# Patient Record
Sex: MALE | Race: WHITE | URBAN - METROPOLITAN AREA
[De-identification: names, ages, dates, MRNs, and addresses within clinical notes are randomized per-mention and may not be internally consistent; named-entity substitution may affect disease eponyms.]

---

## 2018-06-15 ENCOUNTER — TELEPHONE (OUTPATIENT)
Dept: FAMILY MEDICINE | Facility: CLINIC | Age: 33
End: 2018-06-15

## 2018-06-15 NOTE — TELEPHONE ENCOUNTER
Called and left voice message to call Pauline on direct line to discuss upcoming office visit scheduled with Pauline Voss.    If patient returns call please notify Pauline or put through to her voice mail.    Isabel Person RN

## 2018-06-18 NOTE — TELEPHONE ENCOUNTER
Called patient and left voice message to call clinic and speak with Clinic Manager regarding today's cancelled appt and subsequent appt scheduled for 6/28.    Isabel Person RN

## 2018-06-27 NOTE — PROGRESS NOTES
SUBJECTIVE:   CC: Thompson Mohan is an 33 year old male new patient .. He presents for preventative health visit. His last CPE was 10 years ago. He has not been getting regular health exams and has not had a PCP for a long time.  He has the following concerns he would like addressed at his visit today:      1. [facility] 2.  2. Low back and shoulder pain:  Present pain with lifting 2/2018 low back bilaterally.  NO radiation, muscle weakness numbness or tingling.  Acute pain resolved but still has issues intermittently.  May be his mattress.  3. Seasonal allergies:  Runny nose scratchy throat.       Healthy Habits:    Do you get at least three servings of calcium containing foods daily (dairy, green leafy vegetables, etc.)? yes    Amount of exercise or daily activities, outside of work: Does not really work out but is quite active with his work.  He is a freelance     Problems taking medications regularly not applicable    Medication side effects: No    Have you had an eye exam in the past two years? Yes--wears contacts    Do you see a dentist twice per year? No--has an appointment set up    Do you have sleep apnea, excessive snoring or daytime drowsiness?no      Today's PHQ-2 Score:   PHQ-2 ( 1999 [employer]) 6/28/2018   Q1: Little interest or pleasure in doing things 0   Q2: Feeling down, depressed or hopeless 0   PHQ-2 Score 0     There are no active problems to display for this patient.      History reviewed. No pertinent past medical history.    Past Surgical History:   Procedure Laterality Date     ENT SURGERY      Age 13  trampoline injury       Family History   Problem Relation Age of Onset     Type 2 Diabetes Mother      Skin Cancer Maternal Grandfather      No melanoma       Social History   Substance Use Topics     Smoking status: Current Every Day Smoker     Packs/day: 0.50     Years: 18.00     Types: Cigarettes     Smokeless tobacco: Never Used      Comment: Quit for 4  months--has his own plan for quitting     Alcohol use 15.0 oz/week     25 Standard drinks or equivalent per week      Comment: 20-25 drinks a week   CAGE screen negative. He can and does stop drinking.    Social History     Social History Narrative    Lives alone.  No pets.    Originally from [place]. Has been in [place] for 9 years.  Came her for the work.        Has a good support system--kind of a loner.    Feels safe in all environments.--some anxiety in large groups.    Wears seatbelt 100% of the time:: NO--Recommended.    Wears helmet while biking.    Denies history of abuse, past or present, physical, sexual or emotional.    06/28/18    Pauline Voss PA-C                   No current outpatient prescriptions on file.       Reviewed orders with patient. Reviewed health maintenance and updated orders accordingly - Yes    Reviewed and updated as needed this visit by clinical staff  Tobacco  Allergies  Meds  Problems  Med Hx  Surg Hx  Fam Hx  Soc Hx          Reviewed and updated as needed this visit by Provider  Tobacco  Allergies  Meds  Problems  Med Hx  Surg Hx  Fam Hx  Soc Hx         ROS:  CONSTITUTIONAL: NEGATIVE for fever, chills, change in weight  INTEGUMENTARY/SKIN: NEGATIVE for worrisome rashes, moles or lesions  EYES: NEGATIVE for vision changes or irritation  ENT: NEGATIVE for ear, mouth and throat problems  RESP: NEGATIVE for significant cough or SOB  CV: NEGATIVE for chest pain, palpitations or peripheral edema  GI: NEGATIVE for nausea, abdominal pain, heartburn, or change in bowel habits   male: negative for dysuria, hematuria, decreased urinary stream, erectile dysfunction, urethral discharge  MUSCULOSKELETAL: NEGATIVE for significant arthralgias or myalgia  NEURO: NEGATIVE for weakness, dizziness or paresthesias  ENDOCRINE: NEGATIVE for temperature intolerance, skin/hair changes  HEME/ALLERGY/IMMUNE: NEGATIVE for bleeding problems  PSYCHIATRIC: NEGATIVE for changes in mood or  "affect    OBJECTIVE:   /83  Pulse 81  Temp 98.1  F (36.7  C) (Oral)  Ht 6' 2.72\" (189.8 cm)  Wt 209 lb 8 oz (95 kg)  SpO2 98%  BMI 26.38 kg/m2  EXAM:  GENERAL: healthy, alert and no distress  EYES: Eyes grossly normal to inspection, PERRL and conjunctivae and sclerae normal  HENT: ear canals and TM's normal, nose and mouth without ulcers or lesions  NECK: no adenopathy, no asymmetry, masses, or scars and thyroid normal to palpation. No bruits  RESP: lungs clear to auscultation - no rales, rhonchi or wheezes  CV: regular rate and rhythm, normal S1 S2, no S3 or S4, no murmur, click or rub, no peripheral edema and peripheral pulses strong  ABDOMEN: soft, nontender, no hepatosplenomegaly, no masses and bowel sounds normal  MS: no gross musculoskeletal defects noted, no clubbing, edema or cyanosis of extremities.  Pulses = and appropriate bilaterally to DP and PT  SKIN: no suspicious lesions or rashes  NEURO: Normal strength and tone, mentation intact and speech normal  PSYCH: mentation appears normal, affect normal/bright  LYMPH: no cervical, supraclavicular, axillary, or inguinal adenopathy      ASSESSMENT/PLAN:       ICD-10-CM    1. Routine general medical examination at a health care facility Z00.00 TDAP VACCINE (BOOSTRIX)   2. Fatigue, unspecified type R53.83 CBC with platelets differential     Vitamin D deficiency screening     TSH with free T4 reflex   3. Screening for lipid disorders Z13.220 Lipid Panel ([facility])   4. Screening for human immunodeficiency virus Z11.4        COUNSELING:  Reviewed preventive health counseling, as reflected in patient instructions  Special attention given to:        Consider AAA screening for ages 65-75 and smoking history       Regular exercise       Healthy diet/nutrition       Vision screening       Immunizations    Vaccinated for: Pneumococcal and TDAP             Alcohol Use       Safe sex practices/STD prevention       Osteoporosis Prevention/Bone Health       One " "time pneumovax for smokers    BP Readings from Last 1 Encounters:   06/28/18 131/83     Estimated body mass index is 26.38 kg/(m^2) as calculated from the following:    Height as of this encounter: 6' 2.72\" (189.8 cm).    Weight as of this encounter: 209 lb 8 oz (95 kg).       reports that he has been smoking Cigarettes.  He has a 9.00 pack-year smoking history. He has never used smokeless tobacco.  Tobacco Cessation Action Plan: Information offered: Patient not interested at this time  Phone counseling: Place order for QuitPlan (Tobacco Cessation [facility] Referral 6680)    Counseling Resources:  ATP IV Guidelines  Pooled Cohorts Equation Calculator  FRAX Risk Assessment  ICSI Preventive Guidelines  Dietary Guidelines for Americans, 2010  USDA's MyPlate  ASA Prophylaxis  Lung CA Screening    Liliana Voss PA-C  [facility]  "

## 2018-06-27 NOTE — PATIENT INSTRUCTIONS
For allergies use OTC flonase once daily as a controller medication.  Add zyrtec or claritin when needed.        Welcome to [facility], where we are committed to the art of inspired primary care.  Thank you for choosing us to be a part of your well-being.    The clinic is open Monday through Friday, 8:00 a.m.   5:00 p.m., and Saturdays from 8:00 a.m.   12:00 p.m.  After-hours questions are directed to our 24-hour nurse line, which can be reached by calling the clinic at [phone].  You can also contact the clinic through [employer], our online patient portal.  (Please allow 1-2 business days for a response via [employer].)  If you are not already enrolled in [employer], access instructions are below.    If you need a refill on your prescription, please contact the pharmacy where you filled it, and they will contact our clinic with the details of what is needed.  If your prescription is a controlled substance, you will have a conversation with your provider to determine if you would like to  your prescription at the clinic or have it mailed to your pharmacy.  Please allow 2-3 business days for all refill requests to be handled.    We look forward to providing you with great care!  Please let me know if you have any questions.  Thank you for allowing me to participate in your care.  It was my pleasure meeting you.  Pauline Voss PA-C      Preventive Health Recommendations  Male Ages 26 - 39    Yearly exam:             See your health care provider every year in order to  o   Review health changes.   o   Discuss preventive care.    o   Review your medicines if your doctor has prescribed any.    You should be tested each year for STDs (sexually transmitted diseases), if you re at risk.     After age 35, talk to your provider about cholesterol testing. If you are at risk for heart disease, have your cholesterol tested at least every 5 years.     If you are at risk for diabetes, you  should have a diabetes test (fasting glucose).  Shots: Get a flu shot each year. Get a tetanus shot every 10 years.     Nutrition:    Eat at least 5 servings of fruits and vegetables daily.     Eat whole-grain bread, whole-wheat pasta and brown rice instead of white grains and rice.     Get adequate Calcium and Vitamin D.     Lifestyle    Exercise for at least 150 minutes a week (30 minutes a day, 5 days a week). This will help you control your weight and prevent disease.     Limit alcohol to one drink per day.     No smoking.     Wear sunscreen to prevent skin cancer.     See your dentist every six months for an exam and cleaning.   .When You Have Low Back Pain    Caring for Your Back  You are not alone.    Low back pain is very common. Nearly half of all adults have low back pain in any given year. The good news is that back pain is rarely a danger to your health. Most people can manage their back pain on their own and about half of them start feeling better within 2 weeks. In 9 out of 10 cases, low back pain goes away or no longer limits daily activity within 6 weeks.     Your outlook is good!    Your symptoms tell us that your low back pain is most likely not a danger to you. Most of the time we do not know the exact cause of low back pain, even if you see a doctor or have an MRI. However, treatment can still work without knowing the cause of the pain. Less than 1 in 100 people need surgery for their back pain.     What can I do about my low back pain?     There are three things you can do to ease low back pain and help it go away.    Use heat or cold packs.    Take medicine as directed.    Use positions, movements and exercises.     Using heat or cold packs    Try cold packs or gentle heat to ease your pain. Use whichever gives you the most relief. Apply the cold pack or heat for 15 minutes at a time, as often as needed.    Taking medicine      If your doctor has prescribed medicine, be sure to follow the  directions.    If you take over-the-counter medicine, read and follow the directions.    Talk to your doctor if you have any questions.     Using positions, movements and exercises    Research tells us that moving your joints and muscles can help you recover from back pain. Such activity should be simple and gentle. Use the positions in the photos as well as walking to help relieve your pain. Try taking a short walk every 3 to 4 hours during the day. Walk for a few minutes inside your home or take longer walks outside, on a treadmill or at a mall. Slowly increase the amount of time you walk. Expect discomfort when you begin, but it should lessen as your back starts to heal. When your back feels better, walk daily to keep your back and body healthy.    Finding a comfortable position    When your back pain is new, certain positions will ease your pain. Gently try each of the positions below until you find one that is helpful. Once you find a position of comfort, use it as often as you like when you are resting. You will recover faster if you combine rest with activity.         Lie on your back with your legs bent. You can do this by placing a pillow under your knees. Or you may lie on the floor and rest your lower legs on the seat of a chair.       Lie on your side with your knees bent, and place a pillow between your knees.       Lie on your stomach over pillows.      When should I call my doctor?    Your back pain should improve over the first couple of weeks. As it improves, you should be able to return to your normal activities. But call your doctor if:    You have a sudden change in your ability to control your bladder or bowels.    You feel tingling in your groin or legs.    The pain spreads down your leg and into your foot.    Your toes, feet or leg muscles feel weak.    You feel generally unwell or sick.    Your pain does not get better or gets worse.      If you are deaf or hard of hearing, please let us  know. We provide many free services including sign language interpreters,oral interpreters, TTYs, telephone amplifiers, note takers and written materials.    For informational purposes only. Not to replace the advice of your health care provider. Copyright   2013 [facility]. All rights reserved. [employer] [address]   Rev 06/14.

## 2018-06-28 ENCOUNTER — OFFICE VISIT (OUTPATIENT)
Dept: FAMILY MEDICINE | Facility: CLINIC | Age: 33
End: 2018-06-28
Payer: COMMERCIAL

## 2018-06-28 VITALS
HEIGHT: 75 IN | TEMPERATURE: 98.1 F | HEART RATE: 81 BPM | DIASTOLIC BLOOD PRESSURE: 83 MMHG | OXYGEN SATURATION: 98 % | SYSTOLIC BLOOD PRESSURE: 131 MMHG | BODY MASS INDEX: 26.05 KG/M2 | WEIGHT: 209.5 LBS

## 2018-06-28 DIAGNOSIS — Z13.220 SCREENING FOR LIPID DISORDERS: ICD-10-CM

## 2018-06-28 DIAGNOSIS — R53.83 FATIGUE, UNSPECIFIED TYPE: ICD-10-CM

## 2018-06-28 DIAGNOSIS — Z00.00 ROUTINE GENERAL MEDICAL EXAMINATION AT A HEALTH CARE FACILITY: Primary | ICD-10-CM

## 2018-06-28 DIAGNOSIS — Z11.4 SCREENING FOR HUMAN IMMUNODEFICIENCY VIRUS: ICD-10-CM

## 2018-06-28 LAB
BASOPHILS # BLD AUTO: 0 10E9/L (ref 0–0.2)
BASOPHILS NFR BLD AUTO: 0.3 %
CHOLEST SERPL-MCNC: 204 MG/DL (ref 0–200)
CHOLEST/HDLC SERPL: 4.1 {RATIO} (ref 0–5)
DIFFERENTIAL METHOD BLD: ABNORMAL
EOSINOPHIL # BLD AUTO: 0.2 10E9/L (ref 0–0.7)
EOSINOPHIL NFR BLD AUTO: 1.4 %
ERYTHROCYTE [DISTWIDTH] IN BLOOD BY AUTOMATED COUNT: 13 % (ref 10–15)
FASTING SPECIMEN: NO
HCT VFR BLD AUTO: 50.9 % (ref 40–53)
HDLC SERPL-MCNC: 50 MG/DL
HGB BLD-MCNC: 17.9 G/DL (ref 13.3–17.7)
IMM GRANULOCYTES # BLD: 0 10E9/L (ref 0–0.4)
IMM GRANULOCYTES NFR BLD: 0.3 %
LDLC SERPL CALC-MCNC: 109 MG/DL (ref 0–129)
LYMPHOCYTES # BLD AUTO: 2 10E9/L (ref 0.8–5.3)
LYMPHOCYTES NFR BLD AUTO: 18.2 %
MCH RBC QN AUTO: 33.2 PG (ref 26.5–33)
MCHC RBC AUTO-ENTMCNC: 35.2 G/DL (ref 31.5–36.5)
MCV RBC AUTO: 94 FL (ref 78–100)
MONOCYTES # BLD AUTO: 0.6 10E9/L (ref 0–1.3)
MONOCYTES NFR BLD AUTO: 5.6 %
NEUTROPHILS # BLD AUTO: 8.3 10E9/L (ref 1.6–8.3)
NEUTROPHILS NFR BLD AUTO: 74.2 %
NRBC # BLD AUTO: 0 10*3/UL
NRBC BLD AUTO-RTO: 0 /100
PLATELET # BLD AUTO: 156 10E9/L (ref 150–450)
RBC # BLD AUTO: 5.39 10E12/L (ref 4.4–5.9)
TRIGL SERPL-MCNC: 224 MG/DL (ref 0–150)
VLDL-CHOLESTEROL: 45 (ref 7–32)
WBC # BLD AUTO: 11.1 10E9/L (ref 4–11)

## 2018-06-28 NOTE — NURSING NOTE
Screening Questionnaire for Adult Immunization    Are you sick today?   No   Do you have allergies to medications, food, a vaccine component or latex?   No   Have you ever had a serious reaction after receiving a vaccination?   No   Do you have a long-term health problem with heart disease, lung disease, asthma, kidney disease, metabolic disease (e.g. diabetes), anemia, or other blood disorder?   No   Do you have cancer, leukemia, HIV/AIDS, or any other immune system problem?   No   In the past 3 months, have you taken medications that affect  your immune system, such as prednisone, other steroids, or anticancer drugs; drugs for the treatment of rheumatoid arthritis, Crohn s disease, or psoriasis; or have you had radiation treatments?   No   Have you had a seizure, or a brain or other nervous system problem?   No   During the past year, have you received a transfusion of blood or blood     products, or been given immune (gamma) globulin or antiviral drug?   No   For women: Are you pregnant or is there a chance you could become        pregnant during the next month?   No   Have you received any vaccinations in the past 4 weeks?   No     Immunization questionnaire answers were all negative.        Given by Dana Hernandez. Patient instructed to remain in clinic for 15 minutes afterwards, and to report any adverse reaction to me immediately.       Screening performed by Dana Hernandez on 6/28/2018 at 4:13 PM.

## 2018-06-28 NOTE — LETTER
July 1, 2018      Thompson Mohan  [address]   [facility] [address]              Dear Thompson,    Below are your recent lab results.  Your cholesterol and triglycerides are mildly elevated, likely because it was not a fasting specimen.  Your HDL and LDL are in a good range and this is reassuring.  I recommend a healthy diet and regular exercise.  We should recheck your cholesterol in 2 years.    Your white blood cell count and hemoglobin--a measure of iron--were slightly elevated. I suspect it may be because you were a bit dehydrated that day.  Also the elevated hemoglobin is not uncommon in smokers. I don't think we need to repeat at this point.    Your vitamin D level and thyroid test were normal.    Please let me know if you have any questions.  Thank you for allowing me to participate in your care.  It was my pleasure meeting you.    Sincerely,          Liliana Voss PA-C    Results for orders placed or performed in visit on 06/28/18   Lipid Panel ([facility])   Result Value Ref Range    FASTING SPECIMEN no     Cholesterol 204.0 (H) 0.0 - 200.0    HDL Cholesterol 50.0 >40.0    Triglycerides 224.0 (H) 0.0 - 150.0    Cholesterol/HDL Ratio 4.1 0.0 - 5.0    LDL Cholesterol Direct 109.0 0.0 - 129.0    VLDL-Cholesterol 45.0 (H) 7.0 - 32.0   CBC with platelets differential   Result Value Ref Range    WBC 11.1 (H) 4.0 - 11.0 10e9/L    RBC Count 5.39 4.4 - 5.9 10e12/L    Hemoglobin 17.9 (H) 13.3 - 17.7 g/dL    Hematocrit 50.9 40.0 - 53.0 %    MCV 94 78 - 100 fl    MCH 33.2 (H) 26.5 - 33.0 pg    MCHC 35.2 31.5 - 36.5 g/dL    RDW 13.0 10.0 - 15.0 %    Platelet Count 156 150 - 450 10e9/L    Diff Method Automated Method     % Neutrophils 74.2 %    % Lymphocytes 18.2 %    % Monocytes 5.6 %    % Eosinophils 1.4 %    % Basophils 0.3 %    % Immature Granulocytes 0.3 %    Nucleated RBCs 0 0 /100    Absolute Neutrophil 8.3 1.6 - 8.3 10e9/L    Absolute Lymphocytes 2.0 0.8 - 5.3 10e9/L    Absolute Monocytes 0.6 0.0 - 1.3  10e9/L    Absolute Eosinophils 0.2 0.0 - 0.7 10e9/L    Absolute Basophils 0.0 0.0 - 0.2 10e9/L    Abs Immature Granulocytes 0.0 0 - 0.4 10e9/L    Absolute Nucleated RBC 0.0    Vitamin D deficiency screening   Result Value Ref Range    Vitamin D Deficiency screening 42 20 - 75 ug/L   TSH with free T4 reflex   Result Value Ref Range    TSH 1.50 0.40 - 4.00 mU/L

## 2018-06-28 NOTE — MR AVS SNAPSHOT
After Visit Summary   6/28/2018    Thompson Mohan    MRN: [id number]           Patient Information     Date Of Birth          1985        Visit Information        Provider Department      6/28/2018 3:00 PM Liliana Voss PA-C [facility]        Today's Diagnoses     Routine general medical examination at a health care facility    -  1    Screening for lipid disorders        Screening for human immunodeficiency virus        Fatigue, unspecified type          Care Instructions    For allergies use OTC flonase once daily as a controller medication.  Add zyrtec or claritin when needed.        Welcome to [facility], where we are committed to the art of [facility] primary care.  Thank you for choosing us to be a part of your well-being.    The clinic is open Monday through Friday, 8:00 a.m. - 5:00 p.m., and Saturdays from 8:00 a.m. - 12:00 p.m.  After-hours questions are directed to our 24-hour nurse line, which can be reached by calling the clinic at [phone].  You can also contact the clinic through BlikBook, our online patient portal.  (Please allow 1-2 business days for a response via BlikBook.)  If you are not already enrolled in BlikBook, access instructions are below.    If you need a refill on your prescription, please contact the pharmacy where you filled it, and they will contact our clinic with the details of what is needed.  If your prescription is a controlled substance, you will have a conversation with your provider to determine if you would like to  your prescription at the clinic or have it mailed to your pharmacy.  Please allow 2-3 business days for all refill requests to be handled.    We look forward to providing you with great care!  Please let me know if you have any questions.  Thank you for allowing me to participate in your care.  It was my pleasure meeting you.  Pauline Voss PA-C      Preventive Health Recommendations  Male  Ages 26 - 39    Yearly exam:             See your health care provider every year in order to  o   Review health changes.   o   Discuss preventive care.    o   Review your medicines if your doctor has prescribed any.    You should be tested each year for STDs (sexually transmitted diseases), if you re at risk.     After age 35, talk to your provider about cholesterol testing. If you are at risk for heart disease, have your cholesterol tested at least every 5 years.     If you are at risk for diabetes, you should have a diabetes test (fasting glucose).  Shots: Get a flu shot each year. Get a tetanus shot every 10 years.     Nutrition:    Eat at least 5 servings of fruits and vegetables daily.     Eat whole-grain bread, whole-wheat pasta and brown rice instead of white grains and rice.     Get adequate Calcium and Vitamin D.     Lifestyle    Exercise for at least 150 minutes a week (30 minutes a day, 5 days a week). This will help you control your weight and prevent disease.     Limit alcohol to one drink per day.     No smoking.     Wear sunscreen to prevent skin cancer.     See your dentist every six months for an exam and cleaning.   .When You Have Low Back Pain    Caring for Your Back  You are not alone.    Low back pain is very common. Nearly half of all adults have low back pain in any given year. The good news is that back pain is rarely a danger to your health. Most people can manage their back pain on their own and about half of them start feeling better within 2 weeks. In 9 out of 10 cases, low back pain goes away or no longer limits daily activity within 6 weeks.     Your outlook is good!    Your symptoms tell us that your low back pain is most likely not a danger to you. Most of the time we do not know the exact cause of low back pain, even if you see a doctor or have an MRI. However, treatment can still work without knowing the cause of the pain. Less than 1 in 100 people need surgery for their back  pain.     What can I do about my low back pain?     There are three things you can do to ease low back pain and help it go away.    Use heat or cold packs.    Take medicine as directed.    Use positions, movements and exercises.     Using heat or cold packs    Try cold packs or gentle heat to ease your pain. Use whichever gives you the most relief. Apply the cold pack or heat for 15 minutes at a time, as often as needed.    Taking medicine      If your doctor has prescribed medicine, be sure to follow the directions.    If you take over-the-counter medicine, read and follow the directions.    Talk to your doctor if you have any questions.     Using positions, movements and exercises    Research tells us that moving your joints and muscles can help you recover from back pain. Such activity should be simple and gentle. Use the positions in the photos as well as walking to help relieve your pain. Try taking a short walk every 3 to 4 hours during the day. Walk for a few minutes inside your home or take longer walks outside, on a treadmill or at a mall. Slowly increase the amount of time you walk. Expect discomfort when you begin, but it should lessen as your back starts to heal. When your back feels better, walk daily to keep your back and body healthy.    Finding a comfortable position    When your back pain is new, certain positions will ease your pain. Gently try each of the positions below until you find one that is helpful. Once you find a position of comfort, use it as often as you like when you are resting. You will recover faster if you combine rest with activity.         Lie on your back with your legs bent. You can do this by placing a pillow under your knees. Or you may lie on the floor and rest your lower legs on the seat of a chair.       Lie on your side with your knees bent, and place a pillow between your knees.       Lie on your stomach over pillows.      When should I call my doctor?    Your back pain  should improve over the first couple of weeks. As it improves, you should be able to return to your normal activities. But call your doctor if:    You have a sudden change in your ability to control your bladder or bowels.    You feel tingling in your groin or legs.    The pain spreads down your leg and into your foot.    Your toes, feet or leg muscles feel weak.    You feel generally unwell or sick.    Your pain does not get better or gets worse.      If you are deaf or hard of hearing, please let us know. We provide many free services including sign language interpreters,oral interpreters, TTYs, telephone amplifiers, note takers and written materials.    For informational purposes only. Not to replace the advice of your health care provider. Copyright   2013 [employer]. All rights reserved. [employer] 872806 - Rev .            Follow-ups after your visit        Who to contact     Please call your clinic at [phone] to:    Ask questions about your health    Make or cancel appointments    Discuss your medicines    Learn about your test results    Speak to your doctor            Additional Information About Your Visit        Paxer Information     Paxer is an electronic gateway that provides easy, online access to your medical records. With Paxer, you can request a clinic appointment, read your test results, renew a prescription or communicate with your care team.     To sign up for Paxer visit the website at [email or web address]   You will be asked to enter the access code listed below, as well as some personal information. Please follow the directions to create your username and password.     Your access code is: [email or web address]  Expires: 2018  8:41 AM     Your access code will  in 90 days. If you need help or a new code, please contact your [facility] or call [phone] for assistance.        Care EveryWhere ID     This is your Care  "EveryWhere ID. This could be used by other organizations to access your [place] medical records  [id number]        Your Vitals Were     Pulse Temperature Height Pulse Oximetry BMI (Body Mass Index)       81 98.1  F (36.7  C) (Oral) 6' 2.72\" (189.8 cm) 98% 26.38 kg/m2        Blood Pressure from Last 3 Encounters:   06/28/18 131/83    Weight from Last 3 Encounters:   06/28/18 209 lb 8 oz (95 kg)              We Performed the Following     CBC with platelets differential     Lipid Panel (LabDAQ)     TDAP VACCINE (BOOSTRIX)     TSH with free T4 reflex     Vitamin D deficiency screening        Primary Care Provider Office Phone # Fax #    Liliana Voss PA-C [phone]       [address] [place] [address]        Equal Access to Services     MEL GARRISON : Traci moya Sofelisa, waaxda luqadaha, qaybta kaalmada lefty, beti sal . So [facility] [phone].    ATENCIÓN: Si habla español, tiene a wolfe disposición servicios gratuitos de asistencia lingüística. Tierra al [phone].    We comply with applicable federal civil rights laws and [place] laws. We do not discriminate on the basis of race, color, national origin, age, disability, sex, sexual orientation, or gender identity.            Thank you!     Thank you for choosing [facility]  for your care. Our goal is always to provide you with excellent care. Hearing back from our patients is one way we can continue to improve our services. Please take a few minutes to complete the written survey that you may receive in the mail after your visit with us. Thank you!             Your Updated Medication List - Protect others around you: Learn how to safely use, store and throw away your medicines at www.disposemymeds.org.      Notice  As of 6/28/2018  4:10 PM    You have not been prescribed any medications.      "

## 2018-06-28 NOTE — LETTER
Z2 Customer Service  [facility]  [address], [address]  [place] [address]  Fax: [phone]  Phone: [phone]      2018      Thompson Mohan  [address] [place] [address] [address] [place] [address]        Dear Thompson,    Thank you for your interest in becoming a Z2 user!    Your access code is: [email or web address]  Expires: 2018  8:41 AM     Please access the Z2 website at [email or web address].  Below the ID and password fields, select the  Sign Up Now  as New User.  You will be prompted to enter the access code listed above as well as additional personal information.  Please follow the directions carefully when creating your username and password.    If you allow your access code to , or if you have any questions please call a Z2 Representative at [phone] during normal clinic hours.     Sincerely,      Z2 Customer Service  [facility]

## 2018-06-28 NOTE — NURSING NOTE
"33 year old  Chief Complaint   Patient presents with     Establish Care     Physical     he would like to discuss shoulder and back pain and also has some really bad allergies this season     Derm Problem     pt has some dry cracked feet and he is a runner.       Blood pressure 131/83, pulse 81, temperature 98.1  F (36.7  C), temperature source Oral, height 6' 2.72\" (189.8 cm), weight 209 lb 8 oz (95 kg), SpO2 98 %. Body mass index is 26.38 kg/(m^2).  There is no problem list on file for this patient.      Wt Readings from Last 2 Encounters:   06/28/18 209 lb 8 oz (95 kg)     BP Readings from Last 3 Encounters:   06/28/18 131/83         No current outpatient prescriptions on file.     No current facility-administered medications for this visit.        Social History   Substance Use Topics     Smoking status: Current Every Day Smoker     Types: Cigarettes     Smokeless tobacco: Never Used     Alcohol use Yes      Comment: 20-25 drinks a week       Health Maintenance Due   Topic Date Due     PHQ-2 Q1 YR  05/20/1997       No results found for: PAP      June 28, 2018 3:12 PM  "

## 2018-06-29 LAB
DEPRECATED CALCIDIOL+CALCIFEROL SERPL-MC: 42 UG/L (ref 20–75)
TSH SERPL DL<=0.005 MIU/L-ACNC: 1.5 MU/L (ref 0.4–4)

## 2018-07-20 ENCOUNTER — NURSE TRIAGE (OUTPATIENT)
Dept: NURSING | Facility: CLINIC | Age: 33
End: 2018-07-20

## 2018-07-20 NOTE — TELEPHONE ENCOUNTER
FNA Triage Call  Presenting Problem:Awoke today with left markedly  Swollen bottom lip - , without known cause . Guideline Used: swollen lip - adult   Patient Recommendations/Teaching: go to ED now but Pt prefers to make appt tomorrow for [place] instead and sent back to [place] for appt .  .Neda Montalvo RN [facility] nurse advisors.           Reason for Disposition    [1] Severe swelling AND [2] cause unknown    Additional Information    Negative: Unresponsive, passed out or very weak    Negative: Swollen tongue    Negative: Difficulty breathing or wheezing    Negative: [1] Life-threatening reaction in the past to similar substance (e.g., food, insect bite/sting, chemical, etc.) AND [2] < 2 hours since exposure    Negative: Sounds like a life-threatening emergency to the triager    Negative: Followed a lip injury    Negative: Entire face is swollen    Negative: Followed an insect bite to the area    Negative: Cold sore suspected (sore on outer lip)    Negative: Mouth sores or ulcers on inner lip    Negative: [1] Contact with a chemical AND [2] some may have been swallowed    Negative: [1] Contact with a chemical AND [2] none entered the mouth    Negative: Taking an ACE Inhibitor medication  (e.g., benazepril/LOTENSIN, captopril/CAPOTEN, enalapril/VASOTEC, lisinopril/ZESTRIL)    Protocols used: LIP SWELLING-ADULT-

## 2018-07-21 ENCOUNTER — OFFICE VISIT (OUTPATIENT)
Dept: FAMILY MEDICINE | Facility: CLINIC | Age: 33
End: 2018-07-21
Payer: COMMERCIAL

## 2018-07-21 VITALS
HEART RATE: 76 BPM | TEMPERATURE: 97.7 F | SYSTOLIC BLOOD PRESSURE: 124 MMHG | WEIGHT: 206 LBS | OXYGEN SATURATION: 95 % | DIASTOLIC BLOOD PRESSURE: 82 MMHG | BODY MASS INDEX: 25.94 KG/M2

## 2018-07-21 DIAGNOSIS — T78.3XXA ANGIOEDEMA, INITIAL ENCOUNTER: Primary | ICD-10-CM

## 2018-07-21 DIAGNOSIS — J30.1 CHRONIC SEASONAL ALLERGIC RHINITIS DUE TO POLLEN: ICD-10-CM

## 2018-07-21 RX ORDER — PREDNISONE 20 MG/1
TABLET ORAL
Qty: 6 TABLET | Refills: 0 | Status: SHIPPED | OUTPATIENT
Start: 2018-07-21

## 2018-07-21 RX ORDER — FLUTICASONE PROPIONATE 50 MCG
1 SPRAY, SUSPENSION (ML) NASAL DAILY
COMMUNITY

## 2018-07-21 RX ORDER — LORATADINE 10 MG/1
10 CAPSULE, LIQUID FILLED ORAL DAILY
Qty: 30 CAPSULE | COMMUNITY
Start: 2018-07-21

## 2018-07-21 ASSESSMENT — PAIN SCALES - GENERAL: PAINLEVEL: NO PAIN (1)

## 2018-07-21 NOTE — MR AVS SNAPSHOT
After Visit Summary   2018    Thompson Mohan    MRN: [id number]           Patient Information     Date Of Birth          1985        Visit Information        Provider Department      2018 8:40 AM Alexa Montenegro MD [facility]        Today's Diagnoses     Angioedema, initial encounter    -  1       Follow-ups after your visit        Who to contact     Please call your clinic at [phone] to:    Ask questions about your health    Make or cancel appointments    Discuss your medicines    Learn about your test results    Speak to your doctor            Additional Information About Your Visit        MyChart Information     Media Temple is an electronic gateway that provides easy, online access to your medical records. With Media Temple, you can request a clinic appointment, read your test results, renew a prescription or communicate with your care team.     To sign up for Media Temple visit the website at [email or web address]   You will be asked to enter the access code listed below, as well as some personal information. Please follow the directions to create your username and password.     Your access code is: [email or web address]  Expires: 2018  8:41 AM     Your access code will  in 90 days. If you need help or a new code, please contact your [facility] or call [phone] for assistance.        Care EveryWhere ID     This is your Care EveryWhere ID. This could be used by other organizations to access your [place] medical records  [id number]        Your Vitals Were     Pulse Temperature Pulse Oximetry BMI (Body Mass Index)          76 97.7  F (36.5  C) (Oral) 95% 25.94 kg/m2         Blood Pressure from Last 3 Encounters:   18 124/82   18 131/83    Weight from Last 3 Encounters:   18 206 lb (93.4 kg)   18 209 lb 8 oz (95 kg)              Today, you had the following     No orders found for display         Today's Medication  Changes          These changes are accurate as of 7/21/18  9:09 AM.  If you have any questions, ask your nurse or doctor.               Start taking these medicines.        Dose/Directions    predniSONE 20 MG tablet   Commonly known as:  DELTASONE   Used for:  Angioedema, initial encounter   Started by:  Alexa Montenegro MD        Take 2 tablets on day one then one daily x 4 days   Quantity:  6 tablet   Refills:  0       ranitidine 150 MG tablet   Commonly known as:  ZANTAC   Used for:  Angioedema, initial encounter   Started by:  Alexa Montenegro MD        Dose:  150 mg   Take 1 tablet (150 mg) by mouth 2 times daily   Quantity:  60 tablet   Refills:  0            Where to get your medicines      Some of these will need a paper prescription and others can be bought over the counter.  Ask your nurse if you have questions.     Bring a paper prescription for each of these medications     predniSONE 20 MG tablet       You don't need a prescription for these medications     ranitidine 150 MG tablet                Primary Care Provider Office Phone # Fax #    Liliana Voss PA-C [phone]        [address] [place] [address]        Equal Access to Services     [facility]: Hadii jag padilla hadasho Sofelisa, waaxda luqadaha, qaybta kaalmalicha olea, beti sal . So [facility] [phone].    ATENCIÓN: Si habla español, tiene a wolfe disposición servicios gratuitos de asistencia lingüística. Tierra al [phone].    We comply with applicable federal civil rights laws and [place] laws. We do not discriminate on the basis of race, color, national origin, age, disability, sex, sexual orientation, or gender identity.            Thank you!     Thank you for choosing [facility]  for your care. Our goal is always to provide you with excellent care. Hearing back from our patients is one way we can continue to improve our services. Please take a few  minutes to complete the written survey that you may receive in the mail after your visit with us. Thank you!             Your Updated Medication List - Protect others around you: Learn how to safely use, store and throw away your medicines at www.disposemymeds.org.          This list is accurate as of 7/21/18  9:09 AM.  Always use your most recent med list.                   Brand Name Dispense Instructions for use Diagnosis    CLARITIN 10 MG capsule   Generic drug:  loratadine     30 capsule    Take 10 mg by mouth daily    Angioedema, initial encounter       fluticasone 50 MCG/ACT spray    FLONASE     Spray 1 spray into both nostrils daily        predniSONE 20 MG tablet    DELTASONE    6 tablet    Take 2 tablets on day one then one daily x 4 days    Angioedema, initial encounter       ranitidine 150 MG tablet    ZANTAC    60 tablet    Take 1 tablet (150 mg) by mouth 2 times daily    Angioedema, initial encounter

## 2018-07-21 NOTE — PROGRESS NOTES
Thompson Mohan is a 33 year old male here for the following issues:    Swollen bottom lip  Thompson is a 32yo male with hx of seasonal allergies (worse then usual this year). He is otherwise in good health. He presents with asymmetric (left )lower lip swelling. He awoke from sleep at 5 am yesterday with swelling twice the size of current. He has a pimple at the left side of his chin that erupted this morning, tender. His lip simply feels numb. There are no hives, no shortness of breath. He has not had this before. He took some claritin and it is smaller today. He also uses flonase for seasonal allergies and thinks it helps.        There is no problem list on file for this patient.      No current outpatient prescriptions on file.       No Known Allergies     EXAM  /82 (BP Location: Left arm, Patient Position: Chair, Cuff Size: Adult Regular)  Pulse 76  Temp 97.7  F (36.5  C) (Oral)  Wt 206 lb (93.4 kg)  SpO2 95%  BMI 25.94 kg/m2  Gen: Alert, pleasant, NAD  HEENT:  Conjunctiva nl, TM normal bilaterally, OP clear, no tongue swelling. Posterior pharynx is open, patent, no stridor.  Left lateral lower lip is edematous, no fluctuance or tenderness. No mouth lesions.  There is a 3-4mm raised erythematous papule on left chin, tender to touch  Neck without LAD  COR: S1,S2, no murmur  Lungs: CTA bilaterally, no rhonchi, wheezes or rales      Assessment:  (T78.3XXA) Angioedema, initial encounter  (primary encounter diagnosis)  Comment: etiology is unclear, improving spontaneously since yesterday  Plan: loratadine (CLARITIN) 10 MG capsule, predniSONE        (DELTASONE) 20 MG tablet, ranitidine (ZANTAC)         150 MG tablet, ALLERGY/ASTHMA ADULT REFERRAL        Recommend steroid burst, continue Claritin, recommend Ranitidine, refer to allergist. To ER for recurring symptoms associated with tongue/throat swelling or stridor.     Alexa Montenegro MD  Internal Medicine/Pediatrics

## 2018-07-21 NOTE — NURSING NOTE
33 year old  Chief Complaint   Patient presents with     Facial Swelling     noticed there was a pimple located on his bottom lip yesterday. And his lip it has been swollen.       Blood pressure 124/82, pulse 76, temperature 97.7  F (36.5  C), temperature source Oral, weight 206 lb (93.4 kg), SpO2 95 %. Body mass index is 25.94 kg/(m^2).  There is no problem list on file for this patient.      Wt Readings from Last 2 Encounters:   07/21/18 206 lb (93.4 kg)   06/28/18 209 lb 8 oz (95 kg)     BP Readings from Last 3 Encounters:   07/21/18 124/82   06/28/18 131/83         Current Outpatient Prescriptions   Medication     fluticasone (FLONASE) 50 MCG/ACT spray     No current facility-administered medications for this visit.        Social History   Substance Use Topics     Smoking status: Current Every Day Smoker     Packs/day: 0.50     Years: 18.00     Types: Cigarettes     Smokeless tobacco: Never Used      Comment: Quit for 4 months--has his own plan for quitting     Alcohol use 15.0 oz/week     25 Standard drinks or equivalent per week      Comment: 20-25 drinks a week       Health Maintenance Due   Topic Date Due     PNEUMOVAX 1X HI RISK PATIENT < 65 (NO IB MSG)  05/20/1987       No results found for: JOSHUA Razo CMA  July 21, 2018 8:50 AM

## 2023-11-13 ENCOUNTER — NON-APPOINTMENT (OUTPATIENT)
Age: 38
End: 2023-11-13

## 2023-12-19 ENCOUNTER — APPOINTMENT (OUTPATIENT)
Dept: ORTHOPEDIC SURGERY | Facility: CLINIC | Age: 38
End: 2023-12-19

## 2023-12-20 ENCOUNTER — APPOINTMENT (OUTPATIENT)
Dept: ORTHOPEDIC SURGERY | Facility: CLINIC | Age: 38
End: 2023-12-20
Payer: MEDICAID

## 2023-12-20 ENCOUNTER — NON-APPOINTMENT (OUTPATIENT)
Age: 38
End: 2023-12-20

## 2023-12-20 DIAGNOSIS — S69.91XD UNSPECIFIED INJURY OF RIGHT WRIST, HAND AND FINGER(S), SUBSEQUENT ENCOUNTER: ICD-10-CM

## 2023-12-20 PROBLEM — Z00.00 ENCOUNTER FOR PREVENTIVE HEALTH EXAMINATION: Status: ACTIVE | Noted: 2023-12-20

## 2023-12-20 PROCEDURE — 20600 DRAIN/INJ JOINT/BURSA W/O US: CPT

## 2023-12-20 PROCEDURE — 73140 X-RAY EXAM OF FINGER(S): CPT | Mod: F5,RT

## 2023-12-20 PROCEDURE — 99203 OFFICE O/P NEW LOW 30 MIN: CPT | Mod: 25

## 2023-12-20 PROCEDURE — 73130 X-RAY EXAM OF HAND: CPT | Mod: RT

## 2023-12-20 NOTE — PHYSICAL EXAM
[de-identified] : Physical exam demonstrates the patient to be alert and oriented x 3 and capable of ambulation. The patient is well-developed and well-nourished in no apparent respiratory distress. The majority of the skin is intact bilaterally in the upper extremities without any bilateral elbow lymphadenopathy.  The wrists have symmetric range of motion bilaterally. There is no tenderness over the scaphoid, scapholunate, or lunotriquetral ligaments bilaterally. There is a negative Gama's test bilaterally. There is no tenderness over the distal radial ulnar joint or TFCC and no evidence of instability bilaterally. There is no tenderness over the pisotriquetral joint, hamate hook, or CMC joints bilaterally. The patient is nontender over both scaphoids and anatomic snuffbox is bilaterally. There is no clubbing cyanosis or edema.  There is tenderness over the radial aspect of the right thumb MCP joint. There is no instability with varus and valgus stress in extension and with 30 degree flexion at the MCP joint.  EPL and FPL tendons are intact.  No overlying skin changes, erythema, swelling.  Nontender over the right thumb CMC joint and IP joint.  The patient is able to actively fully extend the right thumb MCP joint and flex it to touch the base of the right small finger  There is good capillary refill of the digits bilaterally. Sensation is intact to light touch bilaterally. [de-identified] : PA, lateral and oblique X-Rays of the right thumb were obtained today to assess for bony fracture or dislocation.  No appreciation of acute fracture or dislocation.  Calcifications are appreciated on the radial aspect of the right thumb MCP joint, along the RCL.

## 2023-12-20 NOTE — PROCEDURE
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 We discussed treatment options and he elected to undergo a right thumb MCP joint and RCL steroid injection. The risks, benefits, and alternatives were discussed with the patient. This included, but was not limited to nerve injury, infection, subcutaneous atrophy, skin depigmentation etc. Under informed consent and sterile conditions the ight thumb MCP joint and RCL were injected with a combination of 1/2 cc Kenalog-10 and 1/2 cc of 2% plain lidocaine. It is my hopes that this significantly alleviates the patients symptoms.

## 2023-12-20 NOTE — ASSESSMENT
[FreeTextEntry1] : I had a lengthy discussion with him in today regarding his right thumb MCP joint pain.  Based on his clinical examination and x-ray, I reassured him that no appreciable fractures or dislocations were identified.  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  I recommended rest, protection and immobilization of the right thumb with a custom splint as well as a corticosteroid injection to help diminish his symptoms.  This was administered today without any issues.  I explained it may take several days for the medication to take effect.  I directed he follow back up with me if his symptoms worsen after the weekend or if they do not improve over the next several weeks.
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
